# Patient Record
Sex: MALE | Race: WHITE | NOT HISPANIC OR LATINO | Employment: FULL TIME | URBAN - METROPOLITAN AREA
[De-identification: names, ages, dates, MRNs, and addresses within clinical notes are randomized per-mention and may not be internally consistent; named-entity substitution may affect disease eponyms.]

---

## 2017-10-09 PROBLEM — D75.1 POLYCYTHEMIA: Status: ACTIVE | Noted: 2017-10-09

## 2017-10-09 PROBLEM — I71.40 ABDOMINAL AORTIC ANEURYSM (AAA) WITHOUT RUPTURE: Status: ACTIVE | Noted: 2017-10-09

## 2017-10-09 PROBLEM — Z72.0 TOBACCO ABUSE: Status: ACTIVE | Noted: 2017-10-09

## 2018-03-27 ENCOUNTER — HOSPITAL ENCOUNTER (OUTPATIENT)
Dept: RADIOLOGY | Facility: HOSPITAL | Age: 68
Discharge: HOME | End: 2018-03-27
Attending: FAMILY MEDICINE
Payer: COMMERCIAL

## 2018-03-27 DIAGNOSIS — R05.9 COUGH: ICD-10-CM

## 2018-03-27 PROCEDURE — 71046 X-RAY EXAM CHEST 2 VIEWS: CPT | Mod: TC,PO

## 2018-03-27 PROCEDURE — 71046 X-RAY EXAM CHEST 2 VIEWS: CPT | Mod: 26,,, | Performed by: RADIOLOGY

## 2018-04-05 ENCOUNTER — TELEPHONE (OUTPATIENT)
Dept: RADIOLOGY | Facility: HOSPITAL | Age: 68
End: 2018-04-05

## 2018-04-06 ENCOUNTER — HOSPITAL ENCOUNTER (OUTPATIENT)
Dept: RADIOLOGY | Facility: HOSPITAL | Age: 68
Discharge: HOME | End: 2018-04-06
Attending: FAMILY MEDICINE

## 2018-04-06 DIAGNOSIS — Z72.0 TOBACCO ABUSE: ICD-10-CM

## 2018-04-06 PROCEDURE — 76497 UNLISTED CT PROCEDURE: CPT | Mod: TC,PO

## 2019-06-13 PROBLEM — M51.36 DDD (DEGENERATIVE DISC DISEASE), LUMBAR: Status: ACTIVE | Noted: 2019-06-13

## 2019-07-15 PROBLEM — E78.49 OTHER HYPERLIPIDEMIA: Status: ACTIVE | Noted: 2019-07-15

## 2019-07-25 ENCOUNTER — OFFICE VISIT (OUTPATIENT)
Dept: SURGERY | Facility: CLINIC | Age: 69
End: 2019-07-25
Payer: COMMERCIAL

## 2019-07-25 VITALS
DIASTOLIC BLOOD PRESSURE: 84 MMHG | WEIGHT: 184.94 LBS | TEMPERATURE: 99 F | SYSTOLIC BLOOD PRESSURE: 137 MMHG | HEART RATE: 63 BPM | BODY MASS INDEX: 24.4 KG/M2

## 2019-07-25 DIAGNOSIS — Z72.0 TOBACCO ABUSE: Primary | ICD-10-CM

## 2019-07-25 DIAGNOSIS — K43.2 INCISIONAL HERNIA, WITHOUT OBSTRUCTION OR GANGRENE: ICD-10-CM

## 2019-07-25 PROCEDURE — 99244 OFF/OP CNSLTJ NEW/EST MOD 40: CPT | Mod: S$GLB,,, | Performed by: SURGERY

## 2019-07-25 PROCEDURE — 99999 PR PBB SHADOW E&M-EST. PATIENT-LVL III: CPT | Mod: PBBFAC,,, | Performed by: SURGERY

## 2019-07-25 PROCEDURE — 99999 PR PBB SHADOW E&M-EST. PATIENT-LVL III: ICD-10-PCS | Mod: PBBFAC,,, | Performed by: SURGERY

## 2019-07-25 PROCEDURE — 99244 PR OFFICE CONSULTATION,LEVEL IV: ICD-10-PCS | Mod: S$GLB,,, | Performed by: SURGERY

## 2019-07-25 NOTE — PATIENT INSTRUCTIONS
Have multiple hernias of your incision.  In order to repair the hernias you need to be off tobacco for the minimum of 6 weeks.  The reason for this is that cigarette smoking as a risk factor for recurrence of the hernia due to the fact that it causes decreased blood flow to the tissues with each cigarette and activates an enzyme that breaks down the collagen or tissue that the bodies using to repair itself after surgery.    You should make an appointment with your primary care doctor to discuss tobacco use and how to stop it.  There is also a program available at the end of the after visit summary that you can enroll in.    We will ask Dr. Casillas to send you back to see us after you have been smoke free for at least 6 weeks.  We will need to check you urine for the breakdown products of tobacco use prior to proceeding with hernia repair    If any of the hernia as ever bulging cannot be pushed back in or you have abdominal pain and nausea and vomiting you need to seek care immediately

## 2019-07-25 NOTE — PROGRESS NOTES
Patient ID: Isac Barrera is a 68 y.o. male.    Umbilical/incisional hernia    Chief Complaint: Consult and Hernia      HPI:  Patient underwent repair of a abdominal aortic aneurysm to an open approach.  This was done in February of 2019.  After the surgery he developed several bulges and is incisions.    The patient is a pack-a-day smoker of cigarettes and he continues to smoke.  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    He presents today with the complaint of mild pain on occasion in the hernia and bulging of his abdominal incision      Review of Systems   Constitutional: Negative for activity change and unexpected weight change.   HENT: Negative for hearing loss, rhinorrhea and trouble swallowing.    Eyes: Negative for discharge and visual disturbance.   Respiratory: Negative for chest tightness and wheezing.    Cardiovascular: Negative for chest pain and palpitations.   Gastrointestinal: Negative for blood in stool, constipation, diarrhea and vomiting.   Endocrine: Negative for polydipsia and polyuria.   Genitourinary: Negative for difficulty urinating, hematuria and urgency.   Musculoskeletal: Negative for arthralgias, joint swelling and neck pain.   Neurological: Negative for weakness and headaches.   Psychiatric/Behavioral: Negative for confusion and dysphoric mood.       Current Outpatient Medications   Medication Sig Dispense Refill    aspirin (ECOTRIN) 81 MG EC tablet Take 81 mg by mouth once daily.      rosuvastatin (CRESTOR) 20 MG tablet Take 1 tablet (20 mg total) by mouth every evening. 30 tablet 11     No current facility-administered medications for this visit.        Review of patient's allergies indicates:   Allergen Reactions    Pcn [penicillins] Other (See Comments)     Cannot take it from birth       Past Medical History:   Diagnosis Date    AAA (abdominal aortic aneurysm) without rupture        Past Surgical History:   Procedure Laterality Date    ABDOMINAL AORTIC ANEURYSM REPAIR   02/25/2019    NECK SURGERY         No family history on file.    Social History     Socioeconomic History    Marital status:      Spouse name: Not on file    Number of children: Not on file    Years of education: Not on file    Highest education level: Not on file   Occupational History    Not on file   Social Needs    Financial resource strain: Not on file    Food insecurity:     Worry: Not on file     Inability: Not on file    Transportation needs:     Medical: Not on file     Non-medical: Not on file   Tobacco Use    Smoking status: Current Every Day Smoker     Packs/day: 1.00     Years: 50.00     Pack years: 50.00     Types: Cigarettes    Smokeless tobacco: Never Used   Substance and Sexual Activity    Alcohol use: Yes     Comment: social    Drug use: No    Sexual activity: Not on file   Lifestyle    Physical activity:     Days per week: Not on file     Minutes per session: Not on file    Stress: Not on file   Relationships    Social connections:     Talks on phone: Not on file     Gets together: Not on file     Attends Yazidi service: Not on file     Active member of club or organization: Not on file     Attends meetings of clubs or organizations: Not on file     Relationship status: Not on file   Other Topics Concern    Not on file   Social History Narrative    Not on file       Vitals:    07/25/19 1007   BP: 137/84   Pulse: 63   Temp: 98.6 °F (37 °C)       Physical Exam   Constitutional: He is oriented to person, place, and time. He appears well-developed and well-nourished. No distress.   Neck: Normal range of motion. Neck supple.   Cardiovascular: Normal rate, regular rhythm and normal heart sounds.   Pulmonary/Chest: Effort normal and breath sounds normal.   Some generalized decreased breath sounds   Abdominal: Soft. Bowel sounds are normal. A hernia (There are several wide-based hernias, 4, in the incision.  These are all reducible.  The fascial defects are easily palpable) is  present.   Musculoskeletal: He exhibits no edema.   Neurological: He is alert and oriented to person, place, and time.   Skin: Skin is warm.   Psychiatric: He has a normal mood and affect. His behavior is normal. Judgment and thought content normal.   Vitals reviewed.      Assessment & Plan:      Isac was seen today for consult and hernia.    Diagnoses and all orders for this visit:    Tobacco abuse    Incisional hernia, without obstruction or gangrene    With regard to repair his inguinal hernia this would be done with an open approach with plans to place the mesh in the preperitoneal space.    HOWEVER BEFORE ANY HERNIA REPAIR THE PATIENT NEEDS TO BE TOBACCO FREE FOR A MINIMUM OF 6 WEEKS.  THE REASON FOR THIS IS TOBACCO USE IS AN INDEPENDENT RISK FACTOR FOR RECURRENCE OF THE HERNIA. THIS IS SECONDARY TO THE ACTIVATION OF COLLAGENASE AND THE ISCHEMIA THE THAT OCCURS AT THE LOCAL TISSUE LEVEL WITH TOBACCO USE.    WOULD REQUEST THAT THE PATIENT BE RE-EVALUATED BY HIS PRIMARY CARE DOCTOR AND THAT ASSISTANCE BE PROVIDED FOR TOBACCO  CESSATION.  WE ALSO RECOMMENDED THAT HE A ROLE IN THE TOBACCO CESSATION COURSE [facility] AND IS AVAILABLE ON THE AFTER VISIT SUMMARY

## 2019-11-21 ENCOUNTER — TELEPHONE (OUTPATIENT)
Dept: ENDOSCOPY | Facility: HOSPITAL | Age: 69
End: 2019-11-21

## 2019-11-21 NOTE — TELEPHONE ENCOUNTER
Called pt and spoke pts wife. Advised her that we have an order for him for a colonoscopy that will be expiring soon. She stated that he's out on a boat working and that we need to call him back after Thanksgiving.

## 2019-12-09 ENCOUNTER — PATIENT MESSAGE (OUTPATIENT)
Dept: ENDOSCOPY | Facility: HOSPITAL | Age: 69
End: 2019-12-09

## 2021-05-10 ENCOUNTER — PATIENT MESSAGE (OUTPATIENT)
Dept: RESEARCH | Facility: HOSPITAL | Age: 71
End: 2021-05-10

## 2022-12-06 ENCOUNTER — PATIENT MESSAGE (OUTPATIENT)
Dept: RESEARCH | Facility: HOSPITAL | Age: 72
End: 2022-12-06
Payer: COMMERCIAL